# Patient Record
Sex: MALE | Race: WHITE | NOT HISPANIC OR LATINO | Employment: OTHER | URBAN - METROPOLITAN AREA
[De-identification: names, ages, dates, MRNs, and addresses within clinical notes are randomized per-mention and may not be internally consistent; named-entity substitution may affect disease eponyms.]

---

## 2022-03-30 ENCOUNTER — TELEPHONE (OUTPATIENT)
Dept: UROLOGY | Facility: CLINIC | Age: 72
End: 2022-03-30
Payer: OTHER GOVERNMENT

## 2022-03-30 NOTE — TELEPHONE ENCOUNTER
Spoke with patient, informed [facility] for [facility], appt made, and appt slip mailed, patient verbally understood.

## 2022-05-30 NOTE — PROGRESS NOTES
"[facility]    PCP: [facility] Janelle Pham    Chief Complaint: hydrocele     SUBJECTIVE:       History of Present Illness:  Jack Garcia Jr. is a 71 y.o. male who presents to clinic for hydrocele. He is New  to our clinic.     Complaining of right testicular swelling.   Occasionally has throbbing and pain. Hx of corrected left varicocele. States that this causes his left side to hurt. Complains that testicles touch the water in the toilet.     Had an US which read states is a large right hydrocele. Left thrombosed varicocele. Left epididymal and testicular cysts.     Last urine culture: no documented UTIs  Last creatinine: 0.95 (7/28/21)    Hx of HTN    Past medical, family, and social history reviewed as documented in chart with pertinent positive medical, family, and social history detailed in HPI.    Review of patient's allergies indicates:  No Known Allergies    Past Medical History:   Diagnosis Date    Hypertension     PTSD (post-traumatic stress disorder)      Past Surgical History:   Procedure Laterality Date    APPENDECTOMY      HERNIA REPAIR      TOTAL KNEE ARTHROPLASTY      VARICOCELECTOMY       No family history on file.        Review of Systems   Genitourinary: Positive for scrotal swelling and testicular pain. Negative for difficulty urinating, dysuria, frequency, hematuria, penile swelling and urgency.       OBJECTIVE:     Anticoagulation:  no    Estimated body mass index is 24.65 kg/m² as calculated from the following:    Height as of this encounter: 5' 8" (1.727 m).    Weight as of this encounter: 73.5 kg (162 lb 1.6 oz).    Vital Signs (Most Recent)  Pulse: (!) 56 (05/31/22 0849)  Resp: 18 (05/31/22 0849)  BP: (!) 140/86 (05/31/22 0849)    Physical Exam  Constitutional:       General: He is not in acute distress.     Appearance: Normal appearance. He is not ill-appearing.   HENT:      Head: Normocephalic and atraumatic.   Eyes:      General: No " scleral icterus.  Cardiovascular:      Rate and Rhythm: Normal rate and regular rhythm.   Pulmonary:      Effort: Pulmonary effort is normal. No respiratory distress.   Abdominal:      General: There is no distension.   Genitourinary:     Penis: Normal.       Testes:         Right: Mass, tenderness or swelling not present.         Left: Mass, tenderness or swelling not present.      Comments: Right testicle is palpable. Do not suspect this is a hydrocele. Likely a golfball sized spermatocele as it is posterior to the testicle.   Skin:     Coloration: Skin is not jaundiced.   Neurological:      General: No focal deficit present.      Mental Status: He is alert and oriented to person, place, and time.   Psychiatric:         Mood and Affect: Mood normal.         Behavior: Behavior normal.         Thought Content: Thought content normal.       Imaging:  Per HPI    ASSESSMENT     1. Spermatocele    2. Hypertension, unspecified type      PLAN:     - His main complaint is low hanging testicles as well as left sided pain  - I explained that surgery will not correct either of these  - If he is interested in surgery, I want to see his US as I do not believe this is a hydrocele and appears to be more a spermatocele  - Offered repeat US here, however he [facility] and [facility] in   - He will let me know how he would like to proceed.   - Follow up as needed     Ping Malagon MD       I spent over 45 minutes with the patient. Over 50% of the visit was spent in counseling.      [facility]

## 2022-05-31 ENCOUNTER — OFFICE VISIT (OUTPATIENT)
Dept: UROLOGY | Facility: CLINIC | Age: 72
End: 2022-05-31
Payer: OTHER GOVERNMENT

## 2022-05-31 VITALS
BODY MASS INDEX: 24.57 KG/M2 | SYSTOLIC BLOOD PRESSURE: 140 MMHG | WEIGHT: 162.13 LBS | DIASTOLIC BLOOD PRESSURE: 86 MMHG | HEIGHT: 68 IN | HEART RATE: 56 BPM | RESPIRATION RATE: 18 BRPM

## 2022-05-31 DIAGNOSIS — I10 HYPERTENSION, UNSPECIFIED TYPE: ICD-10-CM

## 2022-05-31 DIAGNOSIS — N43.40 SPERMATOCELE: Primary | ICD-10-CM

## 2022-05-31 PROCEDURE — 99213 OFFICE O/P EST LOW 20 MIN: CPT | Mod: PBBFAC | Performed by: STUDENT IN AN ORGANIZED HEALTH CARE EDUCATION/TRAINING PROGRAM

## 2022-05-31 PROCEDURE — 99204 PR OFFICE/OUTPT VISIT, NEW, LEVL IV, 45-59 MIN: ICD-10-PCS | Mod: S$PBB,,, | Performed by: STUDENT IN AN ORGANIZED HEALTH CARE EDUCATION/TRAINING PROGRAM

## 2022-05-31 PROCEDURE — 99204 OFFICE O/P NEW MOD 45 MIN: CPT | Mod: S$PBB,,, | Performed by: STUDENT IN AN ORGANIZED HEALTH CARE EDUCATION/TRAINING PROGRAM

## 2022-05-31 PROCEDURE — 99999 PR PBB SHADOW E&M-EST. PATIENT-LVL III: CPT | Mod: PBBFAC,,, | Performed by: STUDENT IN AN ORGANIZED HEALTH CARE EDUCATION/TRAINING PROGRAM

## 2022-05-31 PROCEDURE — 99999 PR PBB SHADOW E&M-EST. PATIENT-LVL III: ICD-10-PCS | Mod: PBBFAC,,, | Performed by: STUDENT IN AN ORGANIZED HEALTH CARE EDUCATION/TRAINING PROGRAM

## 2022-05-31 RX ORDER — UBIDECARENONE 75 MG
500 CAPSULE ORAL
COMMUNITY
Start: 2021-07-28

## 2022-05-31 RX ORDER — OLANZAPINE 5 MG/1
2.5 TABLET ORAL
COMMUNITY
Start: 2022-03-17

## 2022-05-31 RX ORDER — KETOCONAZOLE 20 MG/ML
SHAMPOO, SUSPENSION TOPICAL
COMMUNITY
Start: 2021-10-19

## 2022-05-31 RX ORDER — AMOXICILLIN 250 MG
CAPSULE ORAL
COMMUNITY
Start: 2022-02-01

## 2022-05-31 RX ORDER — TRAZODONE HYDROCHLORIDE 100 MG/1
1 TABLET ORAL NIGHTLY PRN
COMMUNITY
Start: 2021-10-19

## 2022-05-31 RX ORDER — DIPHENHYDRAMINE HCL 25 MG
CAPSULE ORAL
COMMUNITY
Start: 2022-03-14

## 2022-05-31 RX ORDER — AMLODIPINE BESYLATE 10 MG/1
10 TABLET ORAL
COMMUNITY
Start: 2021-07-28

## 2022-05-31 RX ORDER — GABAPENTIN 300 MG/1
300 CAPSULE ORAL
COMMUNITY
Start: 2021-07-28

## 2022-05-31 RX ORDER — CARBOXYMETHYLCELLULOSE SODIUM 10 MG/ML
GEL OPHTHALMIC
COMMUNITY
Start: 2022-02-01

## 2022-05-31 RX ORDER — HYDROCODONE BITARTRATE AND ACETAMINOPHEN 10; 325 MG/1; MG/1
1 TABLET ORAL EVERY 4 HOURS PRN
COMMUNITY
Start: 2022-05-23 | End: 2022-12-06 | Stop reason: ALTCHOICE

## 2022-05-31 RX ORDER — MICONAZOLE NITRATE 2 %
CREAM (GRAM) TOPICAL
COMMUNITY
Start: 2022-05-23

## 2022-05-31 RX ORDER — DESONIDE 0.5 MG/G
CREAM TOPICAL
COMMUNITY
Start: 2022-05-17

## 2022-05-31 RX ORDER — FLUOXETINE HYDROCHLORIDE 20 MG/1
20 CAPSULE ORAL
COMMUNITY
Start: 2022-01-06

## 2022-05-31 RX ORDER — ATENOLOL 25 MG/1
25 TABLET ORAL
COMMUNITY
Start: 2021-07-28

## 2022-05-31 RX ORDER — SUMATRIPTAN 6 MG/.5ML
INJECTION, SOLUTION SUBCUTANEOUS
COMMUNITY
Start: 2022-05-23

## 2022-08-19 ENCOUNTER — TELEPHONE (OUTPATIENT)
Dept: UROLOGY | Facility: CLINIC | Age: 72
End: 2022-08-19
Payer: OTHER GOVERNMENT

## 2022-08-19 NOTE — TELEPHONE ENCOUNTER
Spoke with patient, informed the provider [facility] and he can have an appt to discuss options, appt made, he verbally understood.

## 2022-09-20 ENCOUNTER — OFFICE VISIT (OUTPATIENT)
Dept: UROLOGY | Facility: CLINIC | Age: 72
End: 2022-09-20
Payer: OTHER GOVERNMENT

## 2022-09-20 VITALS
BODY MASS INDEX: 24.56 KG/M2 | DIASTOLIC BLOOD PRESSURE: 88 MMHG | HEART RATE: 58 BPM | RESPIRATION RATE: 18 BRPM | WEIGHT: 162.06 LBS | HEIGHT: 68 IN | SYSTOLIC BLOOD PRESSURE: 151 MMHG

## 2022-09-20 DIAGNOSIS — I10 HYPERTENSION, UNSPECIFIED TYPE: ICD-10-CM

## 2022-09-20 DIAGNOSIS — N43.40 SPERMATOCELE: Primary | ICD-10-CM

## 2022-09-20 DIAGNOSIS — Z01.818 PRE-OP TESTING: ICD-10-CM

## 2022-09-20 PROCEDURE — 99214 OFFICE O/P EST MOD 30 MIN: CPT | Mod: S$PBB,,, | Performed by: STUDENT IN AN ORGANIZED HEALTH CARE EDUCATION/TRAINING PROGRAM

## 2022-09-20 PROCEDURE — 99999 PR PBB SHADOW E&M-EST. PATIENT-LVL III: ICD-10-PCS | Mod: PBBFAC,,, | Performed by: STUDENT IN AN ORGANIZED HEALTH CARE EDUCATION/TRAINING PROGRAM

## 2022-09-20 PROCEDURE — 99213 OFFICE O/P EST LOW 20 MIN: CPT | Mod: PBBFAC | Performed by: STUDENT IN AN ORGANIZED HEALTH CARE EDUCATION/TRAINING PROGRAM

## 2022-09-20 PROCEDURE — 99999 PR PBB SHADOW E&M-EST. PATIENT-LVL III: CPT | Mod: PBBFAC,,, | Performed by: STUDENT IN AN ORGANIZED HEALTH CARE EDUCATION/TRAINING PROGRAM

## 2022-09-20 PROCEDURE — 99214 PR OFFICE/OUTPT VISIT, EST, LEVL IV, 30-39 MIN: ICD-10-PCS | Mod: S$PBB,,, | Performed by: STUDENT IN AN ORGANIZED HEALTH CARE EDUCATION/TRAINING PROGRAM

## 2022-09-20 NOTE — H&P (VIEW-ONLY)
[facility]    PCP: [facility] Janelle Pham    Chief Complaint: hydrocele     SUBJECTIVE:       History of Present Illness:  Jack Garcia Jr. is a 72 y.o. male who presents to clinic for hydrocele. He is Established  to our clinic.     On review of previous US he has a confirmed probable spermatocele on the right side.   He presents to discuss treatment options.   States this is very bothersome to him and causing pain.     5/31/22  Complaining of right testicular swelling.   Occasionally has throbbing and pain. Hx of corrected left varicocele. States that this causes his left side to hurt. Complains that testicles touch the water in the toilet.     Had an US which read states is a large right hydrocele. Left thrombosed varicocele. Left epididymal and testicular cysts.     Last urine culture: no documented UTIs  Last creatinine: 0.95 (7/28/21)    Hx of HTN    Past medical, family, and social history reviewed as documented in chart with pertinent positive medical, family, and social history detailed in HPI.    Review of patient's allergies indicates:  No Known Allergies    Past Medical History:   Diagnosis Date    Hypertension     PTSD (post-traumatic stress disorder)      Past Surgical History:   Procedure Laterality Date    APPENDECTOMY      HERNIA REPAIR      TOTAL KNEE ARTHROPLASTY      VARICOCELECTOMY       No family history on file.        Review of Systems   Genitourinary:  Positive for scrotal swelling and testicular pain. Negative for difficulty urinating, dysuria, frequency, hematuria, penile swelling and urgency.     OBJECTIVE:     Anticoagulation:  no    Estimated body mass index is 24.64 kg/m² as calculated from the following:    Height as of this encounter: 5' 8" (1.727 m).    Weight as of this encounter: 73.5 kg (162 lb 0.6 oz).    Vital Signs (Most Recent)  Pulse: (!) 58 (09/20/22 1005)  Resp: 18 (09/20/22 1005)  BP: (!) 151/88 (09/20/22 " 1005)    Physical Exam  Constitutional:       General: He is not in acute distress.     Appearance: Normal appearance. He is not ill-appearing.   HENT:      Head: Normocephalic and atraumatic.   Eyes:      General: No scleral icterus.  Cardiovascular:      Rate and Rhythm: Normal rate and regular rhythm.   Pulmonary:      Effort: Pulmonary effort is normal. No respiratory distress.   Abdominal:      General: There is no distension.   Genitourinary:     Penis: Normal.       Testes:         Right: Mass, tenderness or swelling not present.         Left: Mass, tenderness or swelling not present.      Comments: Right testicle is palpable. 3 cm spermatocele posterior to this. Left testicle normal.  Skin:     Coloration: Skin is not jaundiced.   Neurological:      General: No focal deficit present.      Mental Status: He is alert and oriented to person, place, and time.   Psychiatric:         Mood and Affect: Mood normal.         Behavior: Behavior normal.         Thought Content: Thought content normal.     Imaging:  Per HPI    ASSESSMENT     1. Spermatocele    2. Pre-op testing    3. Hypertension, unspecified type        PLAN:     - He is very bothered by the spermatocele and wants it removed   - I advised that this may not resolve his pain or other symptoms  - I have explained the indication, risks, benefits, and alternatives of the procedure in detail. I discussed the rationale for the procedure and the typical management and expectations. I discussed the risks associated with the procedure. We discussed alternative management options. He voiced understanding following the discussion. He was given the opportunity to ask questions and all these questions were all answered to his satisfaction. He has elected to proceed as planned.   - Scheduled 10/7      Ping Malagon MD

## 2022-09-20 NOTE — PROGRESS NOTES
[facility]    PCP: [facility] Janelle Pham    Chief Complaint: hydrocele     SUBJECTIVE:       History of Present Illness:  Jack Garcia Jr. is a 72 y.o. male who presents to clinic for hydrocele. He is Established  to our clinic.     On review of previous US he has a confirmed probable spermatocele on the right side.   He presents to discuss treatment options.   States this is very bothersome to him and causing pain.     5/31/22  Complaining of right testicular swelling.   Occasionally has throbbing and pain. Hx of corrected left varicocele. States that this causes his left side to hurt. Complains that testicles touch the water in the toilet.     Had an US which read states is a large right hydrocele. Left thrombosed varicocele. Left epididymal and testicular cysts.     Last urine culture: no documented UTIs  Last creatinine: 0.95 (7/28/21)    Hx of HTN    Past medical, family, and social history reviewed as documented in chart with pertinent positive medical, family, and social history detailed in HPI.    Review of patient's allergies indicates:  No Known Allergies    Past Medical History:   Diagnosis Date    Hypertension     PTSD (post-traumatic stress disorder)      Past Surgical History:   Procedure Laterality Date    APPENDECTOMY      HERNIA REPAIR      TOTAL KNEE ARTHROPLASTY      VARICOCELECTOMY       No family history on file.        Review of Systems   Genitourinary:  Positive for scrotal swelling and testicular pain. Negative for difficulty urinating, dysuria, frequency, hematuria, penile swelling and urgency.     OBJECTIVE:     Anticoagulation:  no    Estimated body mass index is 24.64 kg/m² as calculated from the following:    Height as of this encounter: 5' 8" (1.727 m).    Weight as of this encounter: 73.5 kg (162 lb 0.6 oz).    Vital Signs (Most Recent)  Pulse: (!) 58 (09/20/22 1005)  Resp: 18 (09/20/22 1005)  BP: (!) 151/88 (09/20/22 " 1005)    Physical Exam  Constitutional:       General: He is not in acute distress.     Appearance: Normal appearance. He is not ill-appearing.   HENT:      Head: Normocephalic and atraumatic.   Eyes:      General: No scleral icterus.  Cardiovascular:      Rate and Rhythm: Normal rate and regular rhythm.   Pulmonary:      Effort: Pulmonary effort is normal. No respiratory distress.   Abdominal:      General: There is no distension.   Genitourinary:     Penis: Normal.       Testes:         Right: Mass, tenderness or swelling not present.         Left: Mass, tenderness or swelling not present.      Comments: Right testicle is palpable. 3 cm spermatocele posterior to this. Left testicle normal.  Skin:     Coloration: Skin is not jaundiced.   Neurological:      General: No focal deficit present.      Mental Status: He is alert and oriented to person, place, and time.   Psychiatric:         Mood and Affect: Mood normal.         Behavior: Behavior normal.         Thought Content: Thought content normal.     Imaging:  Per HPI    ASSESSMENT     1. Spermatocele    2. Pre-op testing    3. Hypertension, unspecified type        PLAN:     - He is very bothered by the spermatocele and wants it removed   - I advised that this may not resolve his pain or other symptoms  - I have explained the indication, risks, benefits, and alternatives of the procedure in detail. I discussed the rationale for the procedure and the typical management and expectations. I discussed the risks associated with the procedure. We discussed alternative management options. He voiced understanding following the discussion. He was given the opportunity to ask questions and all these questions were all answered to his satisfaction. He has elected to proceed as planned.   - Scheduled 10/7      Ping Malagon MD

## 2022-10-04 ENCOUNTER — LAB VISIT (OUTPATIENT)
Dept: PRIMARY CARE CLINIC | Facility: CLINIC | Age: 72
End: 2022-10-04
Payer: OTHER GOVERNMENT

## 2022-10-04 DIAGNOSIS — N43.40 SPERMATOCELE: ICD-10-CM

## 2022-10-04 LAB — SARS-COV-2 RNA RESP QL NAA+PROBE: NOT DETECTED

## 2022-10-04 PROCEDURE — U0005 INFEC AGEN DETEC AMPLI PROBE: HCPCS | Performed by: STUDENT IN AN ORGANIZED HEALTH CARE EDUCATION/TRAINING PROGRAM

## 2022-10-04 PROCEDURE — U0003 INFECTIOUS AGENT DETECTION BY NUCLEIC ACID (DNA OR RNA); SEVERE ACUTE RESPIRATORY SYNDROME CORONAVIRUS 2 (SARS-COV-2) (CORONAVIRUS DISEASE [COVID-19]), AMPLIFIED PROBE TECHNIQUE, MAKING USE OF HIGH THROUGHPUT TECHNOLOGIES AS DESCRIBED BY CMS-2020-01-R: HCPCS | Performed by: STUDENT IN AN ORGANIZED HEALTH CARE EDUCATION/TRAINING PROGRAM

## 2022-10-04 PROCEDURE — 99900103 DSU ONLY-NO CHARGE-INITIAL HR (STAT)

## 2022-10-06 ENCOUNTER — ANESTHESIA EVENT (OUTPATIENT)
Dept: SURGERY | Facility: HOSPITAL | Age: 72
End: 2022-10-06
Payer: OTHER GOVERNMENT

## 2022-10-07 ENCOUNTER — ANESTHESIA (OUTPATIENT)
Dept: SURGERY | Facility: HOSPITAL | Age: 72
End: 2022-10-07
Payer: OTHER GOVERNMENT

## 2022-10-07 ENCOUNTER — HOSPITAL ENCOUNTER (OUTPATIENT)
Facility: HOSPITAL | Age: 72
Discharge: HOME | End: 2022-10-07
Attending: STUDENT IN AN ORGANIZED HEALTH CARE EDUCATION/TRAINING PROGRAM | Admitting: STUDENT IN AN ORGANIZED HEALTH CARE EDUCATION/TRAINING PROGRAM
Payer: OTHER GOVERNMENT

## 2022-10-07 VITALS
WEIGHT: 155 LBS | DIASTOLIC BLOOD PRESSURE: 91 MMHG | SYSTOLIC BLOOD PRESSURE: 154 MMHG | TEMPERATURE: 98 F | HEART RATE: 64 BPM | BODY MASS INDEX: 23.57 KG/M2 | OXYGEN SATURATION: 97 % | RESPIRATION RATE: 20 BRPM

## 2022-10-07 DIAGNOSIS — N43.40 SPERMATOCELE: Primary | ICD-10-CM

## 2022-10-07 DIAGNOSIS — Z01.818 PREOPERATIVE TESTING: ICD-10-CM

## 2022-10-07 LAB
ANION GAP SERPL CALC-SCNC: 11 MMOL/L (ref 8–16)
BASOPHILS # BLD AUTO: 0.06 K/UL (ref 0–0.2)
BASOPHILS NFR BLD: 0.6 % (ref 0–1.9)
BUN SERPL-MCNC: 11 MG/DL (ref 8–23)
CALCIUM SERPL-MCNC: 10 MG/DL (ref 8.7–10.5)
CHLORIDE SERPL-SCNC: 105 MMOL/L (ref 95–110)
CO2 SERPL-SCNC: 25 MMOL/L (ref 23–29)
CREAT SERPL-MCNC: 1 MG/DL (ref 0.5–1.4)
DIFFERENTIAL METHOD: ABNORMAL
EOSINOPHIL # BLD AUTO: 0 K/UL (ref 0–0.5)
EOSINOPHIL NFR BLD: 0.3 % (ref 0–8)
ERYTHROCYTE [DISTWIDTH] IN BLOOD BY AUTOMATED COUNT: 12.8 % (ref 11.5–14.5)
EST. GFR  (NO RACE VARIABLE): >60 ML/MIN/1.73 M^2
GLUCOSE SERPL-MCNC: 107 MG/DL (ref 70–110)
HCT VFR BLD AUTO: 43.5 % (ref 40–54)
HGB BLD-MCNC: 14.8 G/DL (ref 14–18)
IMM GRANULOCYTES # BLD AUTO: 0.04 K/UL (ref 0–0.04)
IMM GRANULOCYTES NFR BLD AUTO: 0.4 % (ref 0–0.5)
LYMPHOCYTES # BLD AUTO: 1.3 K/UL (ref 1–4.8)
LYMPHOCYTES NFR BLD: 13.2 % (ref 18–48)
MCH RBC QN AUTO: 34.7 PG (ref 27–31)
MCHC RBC AUTO-ENTMCNC: 34 G/DL (ref 32–36)
MCV RBC AUTO: 102 FL (ref 82–98)
MONOCYTES # BLD AUTO: 1 K/UL (ref 0.3–1)
MONOCYTES NFR BLD: 10.3 % (ref 4–15)
NEUTROPHILS # BLD AUTO: 7.1 K/UL (ref 1.8–7.7)
NEUTROPHILS NFR BLD: 75.2 % (ref 38–73)
NRBC BLD-RTO: 0 /100 WBC
PLATELET # BLD AUTO: 264 K/UL (ref 150–450)
PMV BLD AUTO: 10.9 FL (ref 9.2–12.9)
POTASSIUM SERPL-SCNC: 3.9 MMOL/L (ref 3.5–5.1)
RBC # BLD AUTO: 4.26 M/UL (ref 4.6–6.2)
SODIUM SERPL-SCNC: 141 MMOL/L (ref 136–145)
WBC # BLD AUTO: 9.46 K/UL (ref 3.9–12.7)

## 2022-10-07 PROCEDURE — D9220A PRA ANESTHESIA: ICD-10-PCS | Mod: CRNA,,, | Performed by: STUDENT IN AN ORGANIZED HEALTH CARE EDUCATION/TRAINING PROGRAM

## 2022-10-07 PROCEDURE — 88304 PR  SURG PATH,LEVEL III: ICD-10-PCS | Mod: 26,,, | Performed by: STUDENT IN AN ORGANIZED HEALTH CARE EDUCATION/TRAINING PROGRAM

## 2022-10-07 PROCEDURE — 93010 EKG 12-LEAD: ICD-10-PCS | Mod: ,,, | Performed by: INTERNAL MEDICINE

## 2022-10-07 PROCEDURE — D9220A PRA ANESTHESIA: ICD-10-PCS | Mod: ANES,,, | Performed by: ANESTHESIOLOGY

## 2022-10-07 PROCEDURE — 88304 TISSUE EXAM BY PATHOLOGIST: CPT | Mod: 26,,, | Performed by: STUDENT IN AN ORGANIZED HEALTH CARE EDUCATION/TRAINING PROGRAM

## 2022-10-07 PROCEDURE — 88304 TISSUE EXAM BY PATHOLOGIST: CPT | Performed by: STUDENT IN AN ORGANIZED HEALTH CARE EDUCATION/TRAINING PROGRAM

## 2022-10-07 PROCEDURE — 36000706: Performed by: STUDENT IN AN ORGANIZED HEALTH CARE EDUCATION/TRAINING PROGRAM

## 2022-10-07 PROCEDURE — 71000033 HC RECOVERY, INTIAL HOUR: Performed by: STUDENT IN AN ORGANIZED HEALTH CARE EDUCATION/TRAINING PROGRAM

## 2022-10-07 PROCEDURE — D9220A PRA ANESTHESIA: Mod: CRNA,,, | Performed by: STUDENT IN AN ORGANIZED HEALTH CARE EDUCATION/TRAINING PROGRAM

## 2022-10-07 PROCEDURE — 93005 ELECTROCARDIOGRAM TRACING: CPT | Mod: 59

## 2022-10-07 PROCEDURE — 36000707: Performed by: STUDENT IN AN ORGANIZED HEALTH CARE EDUCATION/TRAINING PROGRAM

## 2022-10-07 PROCEDURE — 99900103 DSU ONLY-NO CHARGE-INITIAL HR (STAT): Performed by: STUDENT IN AN ORGANIZED HEALTH CARE EDUCATION/TRAINING PROGRAM

## 2022-10-07 PROCEDURE — 54840 REMOVE EPIDIDYMIS LESION: CPT | Mod: RT,,, | Performed by: STUDENT IN AN ORGANIZED HEALTH CARE EDUCATION/TRAINING PROGRAM

## 2022-10-07 PROCEDURE — 37000009 HC ANESTHESIA EA ADD 15 MINS: Performed by: STUDENT IN AN ORGANIZED HEALTH CARE EDUCATION/TRAINING PROGRAM

## 2022-10-07 PROCEDURE — 85025 COMPLETE CBC W/AUTO DIFF WBC: CPT | Performed by: ANESTHESIOLOGY

## 2022-10-07 PROCEDURE — 80048 BASIC METABOLIC PNL TOTAL CA: CPT | Performed by: ANESTHESIOLOGY

## 2022-10-07 PROCEDURE — 63600175 PHARM REV CODE 636 W HCPCS: Performed by: STUDENT IN AN ORGANIZED HEALTH CARE EDUCATION/TRAINING PROGRAM

## 2022-10-07 PROCEDURE — D9220A PRA ANESTHESIA: Mod: ANES,,, | Performed by: ANESTHESIOLOGY

## 2022-10-07 PROCEDURE — 00920 ANES PX MALE GENITALIA NOS: CPT | Performed by: STUDENT IN AN ORGANIZED HEALTH CARE EDUCATION/TRAINING PROGRAM

## 2022-10-07 PROCEDURE — 36415 COLL VENOUS BLD VENIPUNCTURE: CPT | Performed by: ANESTHESIOLOGY

## 2022-10-07 PROCEDURE — 25000003 PHARM REV CODE 250: Performed by: STUDENT IN AN ORGANIZED HEALTH CARE EDUCATION/TRAINING PROGRAM

## 2022-10-07 PROCEDURE — 93010 ELECTROCARDIOGRAM REPORT: CPT | Mod: ,,, | Performed by: INTERNAL MEDICINE

## 2022-10-07 PROCEDURE — 37000008 HC ANESTHESIA 1ST 15 MINUTES: Performed by: STUDENT IN AN ORGANIZED HEALTH CARE EDUCATION/TRAINING PROGRAM

## 2022-10-07 PROCEDURE — 71000015 HC POSTOP RECOV 1ST HR: Performed by: STUDENT IN AN ORGANIZED HEALTH CARE EDUCATION/TRAINING PROGRAM

## 2022-10-07 PROCEDURE — 99900104 DSU ONLY-NO CHARGE-EA ADD'L HR (STAT): Performed by: STUDENT IN AN ORGANIZED HEALTH CARE EDUCATION/TRAINING PROGRAM

## 2022-10-07 PROCEDURE — 54840 PR EXCIS SPERMATOCELE: ICD-10-PCS | Mod: RT,,, | Performed by: STUDENT IN AN ORGANIZED HEALTH CARE EDUCATION/TRAINING PROGRAM

## 2022-10-07 RX ORDER — FENTANYL CITRATE 50 UG/ML
INJECTION, SOLUTION INTRAMUSCULAR; INTRAVENOUS
Status: DISCONTINUED | OUTPATIENT
Start: 2022-10-07 | End: 2022-10-07

## 2022-10-07 RX ORDER — FENTANYL CITRATE 50 UG/ML
25 INJECTION, SOLUTION INTRAMUSCULAR; INTRAVENOUS EVERY 5 MIN PRN
Status: DISCONTINUED | OUTPATIENT
Start: 2022-10-07 | End: 2022-10-07 | Stop reason: HOSPADM

## 2022-10-07 RX ORDER — ONDANSETRON 4 MG/1
8 TABLET, ORALLY DISINTEGRATING ORAL EVERY 8 HOURS PRN
Status: DISCONTINUED | OUTPATIENT
Start: 2022-10-07 | End: 2022-10-07 | Stop reason: HOSPADM

## 2022-10-07 RX ORDER — HYDROMORPHONE HYDROCHLORIDE 2 MG/ML
0.2 INJECTION, SOLUTION INTRAMUSCULAR; INTRAVENOUS; SUBCUTANEOUS EVERY 5 MIN PRN
Status: DISCONTINUED | OUTPATIENT
Start: 2022-10-07 | End: 2022-10-07 | Stop reason: HOSPADM

## 2022-10-07 RX ORDER — OXYCODONE HYDROCHLORIDE 5 MG/1
5 TABLET ORAL
Status: DISCONTINUED | OUTPATIENT
Start: 2022-10-07 | End: 2022-10-07 | Stop reason: HOSPADM

## 2022-10-07 RX ORDER — ACETAMINOPHEN 10 MG/ML
INJECTION, SOLUTION INTRAVENOUS
Status: DISCONTINUED | OUTPATIENT
Start: 2022-10-07 | End: 2022-10-07

## 2022-10-07 RX ORDER — LIDOCAINE HYDROCHLORIDE 20 MG/ML
INJECTION INTRAVENOUS
Status: DISCONTINUED | OUTPATIENT
Start: 2022-10-07 | End: 2022-10-07

## 2022-10-07 RX ORDER — PROPOFOL 10 MG/ML
VIAL (ML) INTRAVENOUS
Status: DISCONTINUED | OUTPATIENT
Start: 2022-10-07 | End: 2022-10-07

## 2022-10-07 RX ORDER — HYDROCODONE BITARTRATE AND ACETAMINOPHEN 5; 325 MG/1; MG/1
1 TABLET ORAL EVERY 4 HOURS PRN
Status: DISCONTINUED | OUTPATIENT
Start: 2022-10-07 | End: 2022-10-07 | Stop reason: HOSPADM

## 2022-10-07 RX ORDER — ONDANSETRON 2 MG/ML
INJECTION INTRAMUSCULAR; INTRAVENOUS
Status: DISCONTINUED | OUTPATIENT
Start: 2022-10-07 | End: 2022-10-07

## 2022-10-07 RX ORDER — OXYCODONE AND ACETAMINOPHEN 5; 325 MG/1; MG/1
1 TABLET ORAL EVERY 4 HOURS PRN
Qty: 10 TABLET | Refills: 0 | Status: SHIPPED | OUTPATIENT
Start: 2022-10-07 | End: 2022-12-06 | Stop reason: ALTCHOICE

## 2022-10-07 RX ORDER — KETOROLAC TROMETHAMINE 30 MG/ML
INJECTION, SOLUTION INTRAMUSCULAR; INTRAVENOUS
Status: DISCONTINUED | OUTPATIENT
Start: 2022-10-07 | End: 2022-10-07

## 2022-10-07 RX ORDER — MIDAZOLAM HYDROCHLORIDE 1 MG/ML
INJECTION, SOLUTION INTRAMUSCULAR; INTRAVENOUS
Status: DISCONTINUED | OUTPATIENT
Start: 2022-10-07 | End: 2022-10-07

## 2022-10-07 RX ORDER — DEXAMETHASONE SODIUM PHOSPHATE 4 MG/ML
INJECTION, SOLUTION INTRA-ARTICULAR; INTRALESIONAL; INTRAMUSCULAR; INTRAVENOUS; SOFT TISSUE
Status: DISCONTINUED | OUTPATIENT
Start: 2022-10-07 | End: 2022-10-07

## 2022-10-07 RX ORDER — CEFAZOLIN SODIUM 2 G/50ML
2 SOLUTION INTRAVENOUS
Status: COMPLETED | OUTPATIENT
Start: 2022-10-07 | End: 2022-10-07

## 2022-10-07 RX ADMIN — MIDAZOLAM 2 MG: 1 INJECTION INTRAMUSCULAR; INTRAVENOUS at 12:10

## 2022-10-07 RX ADMIN — PROPOFOL 150 MG: 10 INJECTION, EMULSION INTRAVENOUS at 12:10

## 2022-10-07 RX ADMIN — FENTANYL CITRATE 25 MCG: 50 INJECTION, SOLUTION INTRAMUSCULAR; INTRAVENOUS at 01:10

## 2022-10-07 RX ADMIN — ONDANSETRON 4 MG: 2 INJECTION INTRAMUSCULAR; INTRAVENOUS at 12:10

## 2022-10-07 RX ADMIN — SODIUM CHLORIDE, SODIUM GLUCONATE, SODIUM ACETATE, POTASSIUM CHLORIDE, MAGNESIUM CHLORIDE, SODIUM PHOSPHATE, DIBASIC, AND POTASSIUM PHOSPHATE: .53; .5; .37; .037; .03; .012; .00082 INJECTION, SOLUTION INTRAVENOUS at 12:10

## 2022-10-07 RX ADMIN — ACETAMINOPHEN 1000 MG: 10 INJECTION, SOLUTION INTRAVENOUS at 12:10

## 2022-10-07 RX ADMIN — LIDOCAINE HYDROCHLORIDE 100 MG: 20 INJECTION, SOLUTION INTRAVENOUS at 12:10

## 2022-10-07 RX ADMIN — KETOROLAC TROMETHAMINE 30 MG: 30 INJECTION, SOLUTION INTRAMUSCULAR; INTRAVENOUS at 12:10

## 2022-10-07 RX ADMIN — GLYCOPYRROLATE 0.2 MG: 0.2 INJECTION, SOLUTION INTRAMUSCULAR; INTRAVITREAL at 12:10

## 2022-10-07 RX ADMIN — CEFAZOLIN SODIUM 2 G: 2 SOLUTION INTRAVENOUS at 12:10

## 2022-10-07 RX ADMIN — DEXAMETHASONE SODIUM PHOSPHATE 4 MG: 4 INJECTION, SOLUTION INTRA-ARTICULAR; INTRALESIONAL; INTRAMUSCULAR; INTRAVENOUS; SOFT TISSUE at 12:10

## 2022-10-07 NOTE — ANESTHESIA POSTPROCEDURE EVALUATION
Anesthesia Post Evaluation    Patient: Jack Garcia Jr.    Procedure(s) Performed: Procedure(s) (LRB):  EXCISION, SPERMATOCELE (Right)    Final Anesthesia Type: general      Patient location during evaluation: PACU  Patient participation: Yes- Able to Participate  Level of consciousness: awake and alert  Post-procedure vital signs: reviewed and stable  Pain management: adequate  Airway patency: patent    PONV status at discharge: No PONV  Anesthetic complications: no      Cardiovascular status: blood pressure returned to baseline  Respiratory status: unassisted and room air  Hydration status: euvolemic  Follow-up not needed.          Vitals Value Taken Time   /91 10/07/22 1436   Temp 36.7 °C (98 °F) 10/07/22 1325   Pulse 64 10/07/22 1436   Resp 20 10/07/22 1436   SpO2 97 % 10/07/22 1436         Event Time   Out of Recovery 14:20:00         Pain/Himanshu Score: Pain Rating Prior to Med Admin: 0 (10/7/2022  1:55 PM)  Pain Rating Post Med Admin: 0 (10/7/2022  2:36 PM)  Himanshu Score: 10 (10/7/2022  1:55 PM)  Modified Himanshu Score: 20 (10/7/2022  2:36 PM)        
home

## 2022-10-07 NOTE — DISCHARGE SUMMARY
[facility]  Discharge Note  Short Stay    Admit Date: 10/7/2022    Discharge Date and Time: 10/07/2022 1:43 PM      Attending Physician: Ping Malagon MD     Discharge Provider: Ping Malagon    Diagnoses:  Active Hospital Problems    Diagnosis  POA    *Spermatocele [N43.40]  Yes    Hypertension [I10]  Yes      Resolved Hospital Problems   No resolved problems to display.       Discharged Condition: good    Hospital Course: Patient was admitted for outpatient procedure and tolerated the procedure well with no complications. The patient was discharged home in good condition on the same day.       Final Diagnoses: Same as principal problem.    Disposition: Home or Self Care    Follow up/Patient Instructions:    Medications:  Reconciled Home Medications:   Current Discharge Medication List        START taking these medications    Details   oxyCODONE-acetaminophen (PERCOCET) 5-325 mg per tablet Take 1 tablet by mouth every 4 (four) hours as needed for Pain.  Qty: 10 tablet, Refills: 0    Comments: n/a            CONTINUE these medications which have NOT CHANGED    Details   amLODIPine (NORVASC) 10 MG tablet Take 10 mg by mouth.      atenoloL (TENORMIN) 25 MG tablet Take 25 mg by mouth.      gabapentin (NEURONTIN) 300 MG capsule Take 300 mg by mouth.      HYDROcodone-acetaminophen (NORCO)  mg per tablet Take 1 tablet by mouth every 4 (four) hours as needed.      !! nicotine, polacrilex, (NICORETTE) 2 mg Gum Take by mouth.      traZODone (DESYREL) 100 MG tablet Take 1 tablet by mouth nightly as needed.      carboxymethylcellulose 1 % ophthalmic solution Apply to eye.      cyanocobalamin 500 MCG tablet Take 500 mcg by mouth.      desonide (DESOWEN) 0.05 % cream Apply topically.      FLUoxetine 20 MG capsule Take 20 mg by mouth.      ketoconazole (NIZORAL) 2 % shampoo Apply topically.      !! nicotine polacrilex (NICORETTE) 4 MG Gum Take by mouth.      OLANZapine (ZYPREXA) 5 MG tablet Take 2.5 mg  by mouth.      senna-docusate 8.6-50 mg (PERICOLACE) 8.6-50 mg per tablet Take by mouth.      SUMAtriptan succinate 6 mg/0.5 mL Syrg Inject into the skin.      terbinafine HCL (LAMISIL) 1 % cream Apply topically.       !! - Potential duplicate medications found. Please discuss with provider.        Discharge Procedure Orders   Diet general     Call MD for:  temperature >100.4     Call MD for:  persistent nausea and vomiting     Call MD for:  severe uncontrolled pain     Call MD for:  redness, tenderness, or signs of infection (pain, swelling, redness, odor or green/yellow discharge around incision site)     No dressing needed     Activity as tolerated      Follow-up Information       Ping Malagon MD Follow up in 6 week(s).    Specialty: Urology  Why: post op  Contact information:  [address] [place] [address] [phone]                               Ping Malagon MD  Urology Department

## 2022-10-07 NOTE — TRANSFER OF CARE
Anesthesia Transfer of Care Note    Patient: Jack Garcia Jr.    Procedure(s) Performed: Procedure(s) (LRB):  EXCISION, SPERMATOCELE (Right)    Patient location: PACU    Anesthesia Type: general    Transport from OR: Transported from OR on 6-10 L/min O2 by face mask with adequate spontaneous ventilation    Post pain: adequate analgesia    Post assessment: no apparent anesthetic complications and tolerated procedure well    Post vital signs: stable    Level of consciousness: awake    Nausea/Vomiting: no nausea/vomiting    Complications: none    Transfer of care protocol was followed      Last vitals:   Visit Vitals  BP (!) 150/89 (BP Location: Left arm, Patient Position: Lying)   Pulse (!) 52   Temp 36.5 °C (97.7 °F)   Resp 16   Wt 70.3 kg (155 lb)   SpO2 97%   BMI 23.57 kg/m²

## 2022-10-07 NOTE — OP NOTE
[facility]  Operative Note    Date: 10/07/2022    Pre-Op Diagnosis: Right spermatocele    Post-Op Diagnosis: same    Procedure(s) Performed:   1.  Right spermatocelectomy    Specimen(s):  Right spermatocele     Staff Surgeon:  Ping Malagon MD    Anesthesia: General LMA anesthesia    Indications: Jack Garcia Jr. is a 72 y.o. male with a right spermatocele.  He desires surgical correction.      Findings:   - uncomplicated right spermatocelectomy with no complications noted  - epididymis and cord structures intact     Estimated Blood Loss: min    Drains: none    Procedure in detail:  After risks benefits and possible complications of spermatocelectomy were explained, the patient elected to undergo the procedure and consent was obtained. All questions were answered in the pre-operative area. The patient was transferred to the operative suite and placed in supine position on the operative table.  SCDs were applied and working.  Anesthesia was administered and the patient was prepped and draped in the usual sterile fashion. Time out was performed, karli-procedural antibiotics were confirmed.      The patient's spermatocele was palpated and brought to the anterior scrotal skin. A marking pen was used to marcin a 5 cm incision along the midline raphe.  A 15 blade was used to sharply incise the incision.  The underlying dartos and spermatic fascia was dissected with electrocautery until the testicle could be delivered through the scrotal incision. The tunica vaginalis was bluntly dissected free with a moist ray lesa and opened sharply with Metzenbaum scissors. Once this was opened it became evident that there was a spermatocele posterior to the testicle. The sac was carefully dissected sharply as well as with electrocautery from the surrounding tissue off of the testicle and spermatic cord. Care was taken to preserve the spermatic cord and testicle. Once the neck was identified this was tied  with a 2-0 silk and transected. The spermatocele was passed intact off the field for pathologic analysis.  The cord structures were inspected and found to be in tact.      The scrotum was irrigated with NS. Hemostasis was obtained with electrocautery. The testicle was returned to its orthotopic position. The dartos fascia was closed with a 3.0 vicryl in a running fashion. The skin was re approximated with a 4-0 monocryl suture in a horizontal matress fashion. Dermabond, fluffs and scrotal support was applied     The patient tolerated the procedure well and was transferred to the PACU in stable condition     Disposition:  The patient will follow up in 6 weeks .  He was given prescriptions for percocet.  He was instructed to avoid heavy lifting x 2 weeks, ice his scrotum x 48 hours and wear scrotal support x 2 weeks.      Ping Malagon MD

## 2022-10-07 NOTE — PLAN OF CARE
Report to Caroline.Patient denies pain,no nausea, dressing to right scrotum dry intact no drainage, urinated, resting quietly, vs stable, friend in attendance

## 2022-10-07 NOTE — ANESTHESIA PREPROCEDURE EVALUATION
10/07/2022  Jack Garcia Jr. is a 72 y.o., male.      Pre-op Assessment    I have reviewed the Patient Summary Reports.     I have reviewed the Nursing Notes.       Review of Systems  Anesthesia Hx:  No problems with previous Anesthesia    Cardiovascular:   Hypertension    Psych:   Psychiatric History          Physical Exam  General: Well nourished        Anesthesia Plan  Type of Anesthesia, risks & benefits discussed:    Anesthesia Type: Gen Supraglottic Airway  Intra-op Monitoring Plan: Standard ASA Monitors  Post Op Pain Control Plan: multimodal analgesia and IV/PO Opioids PRN  Induction:  IV  Informed Consent: Informed consent signed with the Patient and all parties understand the risks and agree with anesthesia plan.  All questions answered.   ASA Score: 2  Day of Surgery Review of History & Physical: H&P Update referred to the surgeon/provider.    Ready For Surgery From Anesthesia Perspective.     .

## 2022-10-07 NOTE — ANESTHESIA PROCEDURE NOTES
Intubation    Date/Time: 10/7/2022 12:31 PM  Performed by: Joshua Goff CRNA  Authorized by: Joshua Goff CRNA     Intubation:     Induction:  Intravenous    Intubated:  Postinduction    Mask Ventilation:  Easy mask    Attempts:  1    Attempted By:  LUCILLE (Joshua Goff)    Difficult Airway Encountered?: No      Complications:  None    Airway Device:  Supraglottic airway/LMA    Airway Device Size:  4.0    Style/Cuff Inflation:  Cuffed    Secured at:  The lips    Placement Verified By:  Capnometry    Complicating Factors:  None    Findings Post-Intubation:  BS equal bilateral and atraumatic/condition of teeth unchanged

## 2022-10-12 LAB
FINAL PATHOLOGIC DIAGNOSIS: NORMAL
GROSS: NORMAL
Lab: NORMAL

## 2022-11-22 ENCOUNTER — OFFICE VISIT (OUTPATIENT)
Dept: UROLOGY | Facility: CLINIC | Age: 72
End: 2022-11-22
Payer: OTHER GOVERNMENT

## 2022-11-22 VITALS
WEIGHT: 150 LBS | DIASTOLIC BLOOD PRESSURE: 92 MMHG | SYSTOLIC BLOOD PRESSURE: 175 MMHG | BODY MASS INDEX: 22.73 KG/M2 | HEART RATE: 79 BPM | HEIGHT: 68 IN

## 2022-11-22 DIAGNOSIS — N43.40 SPERMATOCELE: Primary | ICD-10-CM

## 2022-11-22 PROCEDURE — 99024 PR POST-OP FOLLOW-UP VISIT: ICD-10-PCS | Mod: S$PBB,,, | Performed by: NURSE PRACTITIONER

## 2022-11-22 PROCEDURE — 99024 POSTOP FOLLOW-UP VISIT: CPT | Mod: S$PBB,,, | Performed by: NURSE PRACTITIONER

## 2022-11-22 PROCEDURE — 99999 PR PBB SHADOW E&M-EST. PATIENT-LVL IV: CPT | Mod: PBBFAC,,, | Performed by: NURSE PRACTITIONER

## 2022-11-22 PROCEDURE — 99214 OFFICE O/P EST MOD 30 MIN: CPT | Mod: PBBFAC | Performed by: NURSE PRACTITIONER

## 2022-11-22 PROCEDURE — 99999 PR PBB SHADOW E&M-EST. PATIENT-LVL IV: ICD-10-PCS | Mod: PBBFAC,,, | Performed by: NURSE PRACTITIONER

## 2022-11-22 NOTE — PROGRESS NOTES
CHIEF COMPLAINT:    Mr. Garcia is a 72 y.o. male presenting for scrotum check.  PRESENTING ILLNESS:    Jack Garcia Jr. is a 72 y.o. male who presents for scrotum check, s/p right spermatocelectomy. Last clinic visit was 9/20/22 with Dr. Malagon.    11/22/22  Patient presents to clinic for scrotum check, s/p spermatocelectomy 10/7/22. Pt reports right side of scrotum is larger now than it was prior to surgery. He reports constant throbbing pain to right scrotum. He denies any drainage or bleeding from incision site and feels that has healed well. He has been wearing good scrotal support since surgery and followed post op instructions as given. Denies any difficulty voiding. Denies dysuria, gross hematuria, flank pain, fever, chills, nausea or vomiting.     10/7/22   Procedure(s) Performed:   1.  Right spermatocelectomy  Findings:   - uncomplicated right spermatocelectomy with no complications noted  - epididymis and cord structures intact   Disposition:  The patient will follow up in 6 weeks .  He was given prescriptions for percocet.  He was instructed to avoid heavy lifting x 2 weeks, ice his scrotum x 48 hours and wear scrotal support x 2 weeks.      9/20/22  On review of previous US he has a confirmed probable spermatocele on the right side.   He presents to discuss treatment options.   States this is very bothersome to him and causing pain.      5/31/22  Complaining of right testicular swelling.   Occasionally has throbbing and pain. Hx of corrected left varicocele. States that this causes his left side to hurt. Complains that testicles touch the water in the toilet.    Had an US which read states is a large right hydrocele. Left thrombosed varicocele. Left epididymal and testicular cysts.    Last urine culture: no documented UTIs  Last creatinine: 0.95 (7/28/21)       REVIEW OF SYSTEMS:    Review of Systems    Constitutional: Negative for fever and chills.   HENT: Negative for hearing loss.   Eyes:  Negative for visual disturbance.   Respiratory: Negative for shortness of breath.   Cardiovascular: Negative for chest pain.   Gastrointestinal: Negative for nausea, vomiting.   Genitourinary:  See above  Neurological: Negative for dizziness.   Hematological: Does not bruise/bleed easily.   Psychiatric/Behavioral: Negative for confusion.       PATIENT HISTORY:    Past Medical History:   Diagnosis Date    Encounter for blood transfusion     birth    Hypertension     PTSD (post-traumatic stress disorder)        Past Surgical History:   Procedure Laterality Date    APPENDECTOMY      HERNIA REPAIR      SPERMATOCELECTOMY Right 10/7/2022    Procedure: EXCISION, SPERMATOCELE;  Surgeon: Ping Malagon MD;  Location: [facility];  Service: Urology;  Laterality: Right;  pt requested to be last    TOTAL KNEE ARTHROPLASTY      VARICOCELECTOMY         No family history on file.    Social History     Socioeconomic History    Marital status:    Tobacco Use    Smoking status: Never    Smokeless tobacco: Never   Substance and Sexual Activity    Alcohol use: Not Currently    Drug use: Yes     Types: Marijuana       Allergies:  Patient has no known allergies.    Medications:    Current Outpatient Medications:     amLODIPine (NORVASC) 10 MG tablet, Take 10 mg by mouth., Disp: , Rfl:     atenoloL (TENORMIN) 25 MG tablet, Take 25 mg by mouth., Disp: , Rfl:     carboxymethylcellulose 1 % ophthalmic solution, Apply to eye., Disp: , Rfl:     cyanocobalamin 500 MCG tablet, Take 500 mcg by mouth., Disp: , Rfl:     desonide (DESOWEN) 0.05 % cream, Apply topically., Disp: , Rfl:     FLUoxetine 20 MG capsule, Take 20 mg by mouth., Disp: , Rfl:     gabapentin (NEURONTIN) 300 MG capsule, Take 300 mg by mouth., Disp: , Rfl:     HYDROcodone-acetaminophen (NORCO)  mg per tablet, Take 1 tablet by mouth every 4 (four) hours as needed., Disp: , Rfl:     ketoconazole (NIZORAL) 2 % shampoo, Apply topically., Disp: , Rfl:     nicotine  polacrilex (NICORETTE) 4 MG Gum, Take by mouth., Disp: , Rfl:     nicotine, polacrilex, (NICORETTE) 2 mg Gum, Take by mouth., Disp: , Rfl:     OLANZapine (ZYPREXA) 5 MG tablet, Take 2.5 mg by mouth., Disp: , Rfl:     oxyCODONE-acetaminophen (PERCOCET) 5-325 mg per tablet, Take 1 tablet by mouth every 4 (four) hours as needed for Pain., Disp: 10 tablet, Rfl: 0    senna-docusate 8.6-50 mg (PERICOLACE) 8.6-50 mg per tablet, Take by mouth., Disp: , Rfl:     SUMAtriptan succinate 6 mg/0.5 mL Syrg, Inject into the skin., Disp: , Rfl:     terbinafine HCL (LAMISIL) 1 % cream, Apply topically., Disp: , Rfl:     traZODone (DESYREL) 100 MG tablet, Take 1 tablet by mouth nightly as needed., Disp: , Rfl:     PHYSICAL EXAMINATION:    Constitutional: He is oriented to person, place, and time. He appears well-developed and well-nourished.  He is in no apparent distress.    Neck: Normal ROM.     Cardiovascular: Normal rate.      Pulmonary/Chest: Effort normal. No respiratory distress.     Abdominal:  He exhibits no distension.  There is no CVA tenderness.     Neurological: He is alert and oriented to person, place, and time.     Skin: Skin is warm and dry.     Psych: Cooperative with normal affect.    Genitourinary: Swelling to right scrotum. + ecchymosis to right scrotum. Tenderness on exam. Incision site healed.    Physical Exam      LABS:    No results found for: PSA, PSADIAG, PSATOTAL, PSAFREE, PSAFREEPCT  Lab Results   Component Value Date    CREATININE 1.0 10/07/2022         IMPRESSION:    Encounter Diagnoses   Name Primary?    Spermatocele Yes         PLAN:  -Scrotal US  -Message sent to Dr. Malagon staff to schedule f/u appt  -Continue good scrotal support (jock strap). Elevated scrotum at night.   -BP elevated. Asymptomatic. Monitor BP at home. Instructed to follow up with PCP for elevated BP. If becomes symptomatic, go to ED for evaluation.      I encouraged him or any of his family members to call or email me with  questions and/or concerns.      30 minutes of total time spent on the encounter, which includes face to face time and non-face to face time preparing to see the patient (eg, review of tests), Obtaining and/or reviewing separately obtained history, Documenting clinical information in the electronic or other health record, Independently interpreting results (not separately reported) and communicating results to the patient/family/caregiver, or Care coordination (not separately reported).

## 2022-11-30 ENCOUNTER — HOSPITAL ENCOUNTER (OUTPATIENT)
Dept: RADIOLOGY | Facility: HOSPITAL | Age: 72
Discharge: HOME | End: 2022-11-30
Attending: NURSE PRACTITIONER
Payer: OTHER GOVERNMENT

## 2022-11-30 DIAGNOSIS — N43.40 SPERMATOCELE: ICD-10-CM

## 2022-11-30 PROCEDURE — 76870 US EXAM SCROTUM: CPT | Mod: TC

## 2022-11-30 PROCEDURE — 76870 US EXAM SCROTUM: CPT | Mod: 26,,, | Performed by: RADIOLOGY

## 2022-11-30 PROCEDURE — 76870 US SCROTUM AND TESTICLES: ICD-10-PCS | Mod: 26,,, | Performed by: RADIOLOGY

## 2022-12-06 ENCOUNTER — OFFICE VISIT (OUTPATIENT)
Dept: UROLOGY | Facility: CLINIC | Age: 72
End: 2022-12-06
Payer: OTHER GOVERNMENT

## 2022-12-06 VITALS
DIASTOLIC BLOOD PRESSURE: 73 MMHG | SYSTOLIC BLOOD PRESSURE: 146 MMHG | BODY MASS INDEX: 22.72 KG/M2 | WEIGHT: 149.94 LBS | HEART RATE: 52 BPM | RESPIRATION RATE: 18 BRPM | HEIGHT: 68 IN

## 2022-12-06 DIAGNOSIS — N43.40 SPERMATOCELE: Primary | ICD-10-CM

## 2022-12-06 DIAGNOSIS — I10 HYPERTENSION, UNSPECIFIED TYPE: ICD-10-CM

## 2022-12-06 PROCEDURE — 99024 PR POST-OP FOLLOW-UP VISIT: ICD-10-PCS | Mod: ,,, | Performed by: STUDENT IN AN ORGANIZED HEALTH CARE EDUCATION/TRAINING PROGRAM

## 2022-12-06 PROCEDURE — 99999 PR PBB SHADOW E&M-EST. PATIENT-LVL III: ICD-10-PCS | Mod: PBBFAC,,, | Performed by: STUDENT IN AN ORGANIZED HEALTH CARE EDUCATION/TRAINING PROGRAM

## 2022-12-06 PROCEDURE — 99213 OFFICE O/P EST LOW 20 MIN: CPT | Mod: PBBFAC | Performed by: STUDENT IN AN ORGANIZED HEALTH CARE EDUCATION/TRAINING PROGRAM

## 2022-12-06 PROCEDURE — 99024 POSTOP FOLLOW-UP VISIT: CPT | Mod: ,,, | Performed by: STUDENT IN AN ORGANIZED HEALTH CARE EDUCATION/TRAINING PROGRAM

## 2022-12-06 PROCEDURE — 99999 PR PBB SHADOW E&M-EST. PATIENT-LVL III: CPT | Mod: PBBFAC,,, | Performed by: STUDENT IN AN ORGANIZED HEALTH CARE EDUCATION/TRAINING PROGRAM

## 2023-03-07 ENCOUNTER — OFFICE VISIT (OUTPATIENT)
Dept: UROLOGY | Facility: CLINIC | Age: 73
End: 2023-03-07
Payer: OTHER GOVERNMENT

## 2023-03-07 VITALS — BODY MASS INDEX: 23.49 KG/M2 | HEIGHT: 68 IN | WEIGHT: 155 LBS

## 2023-03-07 DIAGNOSIS — N50.89 SCROTAL SWELLING: Primary | ICD-10-CM

## 2023-03-07 DIAGNOSIS — N43.40 SPERMATOCELE: ICD-10-CM

## 2023-03-07 PROCEDURE — 99999 PR PBB SHADOW E&M-EST. PATIENT-LVL III: CPT | Mod: PBBFAC,,, | Performed by: NURSE PRACTITIONER

## 2023-03-07 PROCEDURE — 99999 PR PBB SHADOW E&M-EST. PATIENT-LVL III: ICD-10-PCS | Mod: PBBFAC,,, | Performed by: NURSE PRACTITIONER

## 2023-03-07 PROCEDURE — 99213 OFFICE O/P EST LOW 20 MIN: CPT | Mod: PBBFAC | Performed by: NURSE PRACTITIONER

## 2023-03-07 PROCEDURE — 99214 PR OFFICE/OUTPT VISIT, EST, LEVL IV, 30-39 MIN: ICD-10-PCS | Mod: S$PBB,,, | Performed by: NURSE PRACTITIONER

## 2023-03-07 PROCEDURE — 99214 OFFICE O/P EST MOD 30 MIN: CPT | Mod: S$PBB,,, | Performed by: NURSE PRACTITIONER

## 2023-03-07 NOTE — PROGRESS NOTES
CHIEF COMPLAINT:    Mr. Garcia is a 72 y.o. male presenting for scrotum check.  PRESENTING ILLNESS:    Jack Garcia Jr. is a 72 y.o. male who presents for scrotum check, s/p right spermatocelectomy. Last clinic visit was 12/6/22 with Dr. Malagon.    3/7/23  Patient reports scrotal swelling and pain has resolved. He is pleased with results of surgery. Denies any issues voiding. Denies dysuria, gross hematuria, flank pain, fever, chills, nausea or vomiting.    12/6/22 with Dr. Malagon  S/P spermatocelectomy on 10/7.  He has residual swelling and US was performed which shows a hematoma. There appears to be a fluid collection posterior to the testicle that is contained. This is confirmed on exam. There is no swelling of the scrotum and no erythema.      Has some minor throbbing pain at times.        11/22/22  Patient presents to clinic for scrotum check, s/p spermatocelectomy 10/7/22. Pt reports right side of scrotum is larger now than it was prior to surgery. He reports constant throbbing pain to right scrotum. He denies any drainage or bleeding from incision site and feels that has healed well. He has been wearing good scrotal support since surgery and followed post op instructions as given. Denies any difficulty voiding. Denies dysuria, gross hematuria, flank pain, fever, chills, nausea or vomiting.     10/7/22   Procedure(s) Performed:   1.  Right spermatocelectomy  Findings:   - uncomplicated right spermatocelectomy with no complications noted  - epididymis and cord structures intact   Disposition:  The patient will follow up in 6 weeks .  He was given prescriptions for percocet.  He was instructed to avoid heavy lifting x 2 weeks, ice his scrotum x 48 hours and wear scrotal support x 2 weeks.      9/20/22  On review of previous US he has a confirmed probable spermatocele on the right side.   He presents to discuss treatment options.   States this is very bothersome to him and causing pain.       5/31/22  Complaining of right testicular swelling.   Occasionally has throbbing and pain. Hx of corrected left varicocele. States that this causes his left side to hurt. Complains that testicles touch the water in the toilet.    Had an US which read states is a large right hydrocele. Left thrombosed varicocele. Left epididymal and testicular cysts.    Last urine culture: no documented UTIs  Last creatinine: 0.95 (7/28/21)       REVIEW OF SYSTEMS:    Review of Systems    Constitutional: Negative for fever and chills.   HENT: Negative for hearing loss.   Eyes: Negative for visual disturbance.   Respiratory: Negative for shortness of breath.   Cardiovascular: Negative for chest pain.   Gastrointestinal: Negative for nausea, vomiting.   Genitourinary:  See above  Neurological: Negative for dizziness.   Hematological: Does not bruise/bleed easily.   Psychiatric/Behavioral: Negative for confusion.       PATIENT HISTORY:    Past Medical History:   Diagnosis Date    Encounter for blood transfusion     birth    Hypertension     PTSD (post-traumatic stress disorder)        Past Surgical History:   Procedure Laterality Date    APPENDECTOMY      HERNIA REPAIR      SPERMATOCELECTOMY Right 10/7/2022    Procedure: EXCISION, SPERMATOCELE;  Surgeon: Ping Malagon MD;  Location: [facility];  Service: Urology;  Laterality: Right;  pt requested to be last    TOTAL KNEE ARTHROPLASTY      VARICOCELECTOMY         History reviewed. No pertinent family history.    Social History     Socioeconomic History    Marital status:    Tobacco Use    Smoking status: Never    Smokeless tobacco: Never   Substance and Sexual Activity    Alcohol use: Not Currently    Drug use: Yes     Types: Marijuana       Allergies:  Patient has no known allergies.    Medications:    Current Outpatient Medications:     amLODIPine (NORVASC) 10 MG tablet, Take 10 mg by mouth., Disp: , Rfl:     atenoloL (TENORMIN) 25 MG tablet, Take 25 mg by mouth., Disp: ,  Rfl:     carboxymethylcellulose 1 % ophthalmic solution, Apply to eye., Disp: , Rfl:     cyanocobalamin 500 MCG tablet, Take 500 mcg by mouth., Disp: , Rfl:     desonide (DESOWEN) 0.05 % cream, Apply topically., Disp: , Rfl:     FLUoxetine 20 MG capsule, Take 20 mg by mouth., Disp: , Rfl:     gabapentin (NEURONTIN) 300 MG capsule, Take 300 mg by mouth., Disp: , Rfl:     ketoconazole (NIZORAL) 2 % shampoo, Apply topically., Disp: , Rfl:     nicotine polacrilex (NICORETTE) 4 MG Gum, Take by mouth., Disp: , Rfl:     nicotine, polacrilex, (NICORETTE) 2 mg Gum, Take by mouth., Disp: , Rfl:     OLANZapine (ZYPREXA) 5 MG tablet, Take 2.5 mg by mouth., Disp: , Rfl:     senna-docusate 8.6-50 mg (PERICOLACE) 8.6-50 mg per tablet, Take by mouth., Disp: , Rfl:     SUMAtriptan succinate 6 mg/0.5 mL Syrg, Inject into the skin., Disp: , Rfl:     terbinafine HCL (LAMISIL) 1 % cream, Apply topically., Disp: , Rfl:     traZODone (DESYREL) 100 MG tablet, Take 1 tablet by mouth nightly as needed., Disp: , Rfl:     PHYSICAL EXAMINATION:    Constitutional: He is oriented to person, place, and time. He appears well-developed and well-nourished.  He is in no apparent distress.    Neck: Normal ROM.     Pulmonary/Chest: Effort normal. No respiratory distress.     Abdominal:  He exhibits no distension.  There is no CVA tenderness.     Neurological: He is alert and oriented to person, place, and time.     Skin: Skin is warm and dry.     Psych: Cooperative with normal affect.    Genitourinary: No scrotal swelling on exam. No pain or tenderness on exam. Normal exam.    Physical Exam      LABS:    No results found for: PSA, PSADIAG, PSATOTAL, PSAFREE, PSAFREEPCT  Lab Results   Component Value Date    CREATININE 1.0 10/07/2022         IMPRESSION:    Encounter Diagnoses   Name Primary?    Scrotal swelling Yes    Spermatocele            PLAN:  -RTC as needed      I encouraged him or any of his family members to call or email me with questions and/or  concerns.      30 minutes of total time spent on the encounter, which includes face to face time and non-face to face time preparing to see the patient (eg, review of tests), Obtaining and/or reviewing separately obtained history, Documenting clinical information in the electronic or other health record, Independently interpreting results (not separately reported) and communicating results to the patient/family/caregiver, or Care coordination (not separately reported).

## 2025-02-19 NOTE — PROGRESS NOTES
[facility]    PCP: [facility] Janelle Pham    Chief Complaint: hydrocele     SUBJECTIVE:       History of Present Illness:  Jack Garcia Jr. is a 72 y.o. male who presents to clinic for hydrocele. He is Established  to our clinic.     S/P spermatocelectomy on 10/7.  He has residual swelling and US was performed which shows a hematoma. There appears to be a fluid collection posterior to the testicle that is contained. This is confirmed on exam. There is no swelling of the scrotum and no erythema.     Has some minor throbbing pain at times.     9/20/22  On review of previous US he has a confirmed probable spermatocele on the right side.   He presents to discuss treatment options.   States this is very bothersome to him and causing pain.     5/31/22  Complaining of right testicular swelling.   Occasionally has throbbing and pain. Hx of corrected left varicocele. States that this causes his left side to hurt. Complains that testicles touch the water in the toilet.     Had an US which read states is a large right hydrocele. Left thrombosed varicocele. Left epididymal and testicular cysts.     Last urine culture: no documented UTIs  Last creatinine: 0.95 (7/28/21)    Hx of HTN    Past medical, family, and social history reviewed as documented in chart with pertinent positive medical, family, and social history detailed in HPI.    Review of patient's allergies indicates:  No Known Allergies    Past Medical History:   Diagnosis Date    Encounter for blood transfusion     birth    Hypertension     PTSD (post-traumatic stress disorder)      Past Surgical History:   Procedure Laterality Date    APPENDECTOMY      HERNIA REPAIR      SPERMATOCELECTOMY Right 10/7/2022    Procedure: EXCISION, SPERMATOCELE;  Surgeon: Ping Malagon MD;  Location: [facility];  Service: Urology;  Laterality: Right;  pt requested to be last    TOTAL KNEE ARTHROPLASTY      VARICOCELECTOMY       No  "family history on file.  Social History     Tobacco Use    Smoking status: Never    Smokeless tobacco: Never   Substance Use Topics    Alcohol use: Not Currently    Drug use: Yes     Types: Marijuana        Review of Systems    OBJECTIVE:     Anticoagulation:  no    Estimated body mass index is 22.79 kg/m² as calculated from the following:    Height as of this encounter: 5' 8" (1.727 m).    Weight as of this encounter: 68 kg (149 lb 14.6 oz).    Vital Signs (Most Recent)  Pulse: (!) 52 (12/06/22 0911)  Resp: 18 (12/06/22 0911)  BP: (!) 146/73 (12/06/22 0911)    Physical Exam  Imaging:  Per HPI    ASSESSMENT     1. Spermatocele    2. Hypertension, unspecified type      PLAN:     - Discussed the need for scrotal support   - Will continue to monitor swelling   - Follow up with NP in 3 months       Ping Malagon MD         " [facility] provides services at a reduced cost to those who are determined to be eligible through [facility]’s financial assistance program. Information regarding [facility]’s financial assistance program can be found by going to [email or web address] or by calling [phone].

## (undated) DEVICE — UNDERGLOVE BIOGEL PI SZ 6.5 LF

## (undated) DEVICE — ADHESIVE DERMABOND ADVANCED

## (undated) DEVICE — ELECTRODE REM PLYHSV RETURN 9

## (undated) DEVICE — SUT MONOCRYL 4-0 PS-2

## (undated) DEVICE — APPLICATOR CHLORAPREP ORN 26ML

## (undated) DEVICE — DRAPE MINOR FEN 98X77X121IN

## (undated) DEVICE — SLEEVE SCD EXPRESS KNEE MEDIUM

## (undated) DEVICE — SPONGE DERMACEA GAUZE 4X4

## (undated) DEVICE — GLOVE SURG ULTRA TOUCH 6

## (undated) DEVICE — SUT 3-0 VICRYL / SH (J416)

## (undated) DEVICE — SUT CHROMIC 3-0 SH 27IN GUT

## (undated) DEVICE — SOL 9P NACL IRR PIC IL

## (undated) DEVICE — GOWN POLY REINF BRTH SLV XL

## (undated) DEVICE — GOWN POLY REINF BRTH SLV LG

## (undated) DEVICE — SPONGE BULKEE II ABSRB 6X6.75

## (undated) DEVICE — SUPPORTER ADLT A3 3 IN. WB LA

## (undated) DEVICE — STRAP OR TABLE 5IN X 72IN

## (undated) DEVICE — TOWEL OR DISP STRL BLUE 4/PK

## (undated) DEVICE — ELECTRODE BLADE INSULATED 1 IN

## (undated) DEVICE — SUT MONOCRYL 4-0 SH UND MON

## (undated) DEVICE — PACK CUSTOM UNIV BASIN SLI

## (undated) DEVICE — GLOVE BIOGEL PIMICRO INDIC 7.5

## (undated) DEVICE — GLOVE SURGEONS ULTRA TOUCH 6.5

## (undated) DEVICE — COVER PROXIMA MAYO STAND

## (undated) DEVICE — SUT 2-0 12-18IN SILK

## (undated) DEVICE — GLOVE SURG ULTRA TOUCH 7.5

## (undated) DEVICE — Device